# Patient Record
Sex: FEMALE | Race: WHITE | NOT HISPANIC OR LATINO | Employment: STUDENT | URBAN - METROPOLITAN AREA
[De-identification: names, ages, dates, MRNs, and addresses within clinical notes are randomized per-mention and may not be internally consistent; named-entity substitution may affect disease eponyms.]

---

## 2017-01-03 DIAGNOSIS — M54.50 LUMBAR SPINE PAIN: Primary | ICD-10-CM

## 2017-01-04 ENCOUNTER — OFFICE VISIT (OUTPATIENT)
Dept: PSYCHIATRY | Facility: CLINIC | Age: 22
End: 2017-01-04
Payer: COMMERCIAL

## 2017-01-04 DIAGNOSIS — F31.9 BIPOLAR 1 DISORDER: Primary | ICD-10-CM

## 2017-01-04 DIAGNOSIS — Z63.0 MARITAL CONFLICT: ICD-10-CM

## 2017-01-04 PROCEDURE — 90847 FAMILY PSYTX W/PT 50 MIN: CPT | Mod: S$GLB,,, | Performed by: SOCIAL WORKER

## 2017-01-04 SDOH — SOCIAL DETERMINANTS OF HEALTH (SDOH): PROBLEMS IN RELATIONSHIP WITH SPOUSE OR PARTNER: Z63.0
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
## 2017-01-06 ENCOUNTER — HOSPITAL ENCOUNTER (OUTPATIENT)
Dept: RADIOLOGY | Facility: HOSPITAL | Age: 22
Discharge: HOME | End: 2017-01-06
Attending: ORTHOPAEDIC SURGERY
Payer: COMMERCIAL

## 2017-01-06 ENCOUNTER — OFFICE VISIT (OUTPATIENT)
Dept: ORTHOPEDICS | Facility: CLINIC | Age: 22
End: 2017-01-06
Payer: COMMERCIAL

## 2017-01-06 ENCOUNTER — OFFICE VISIT (OUTPATIENT)
Dept: PSYCHIATRY | Facility: CLINIC | Age: 22
End: 2017-01-06
Payer: COMMERCIAL

## 2017-01-06 VITALS
DIASTOLIC BLOOD PRESSURE: 77 MMHG | WEIGHT: 249 LBS | HEIGHT: 67 IN | HEART RATE: 97 BPM | BODY MASS INDEX: 39.08 KG/M2 | SYSTOLIC BLOOD PRESSURE: 122 MMHG

## 2017-01-06 VITALS
WEIGHT: 248.88 LBS | SYSTOLIC BLOOD PRESSURE: 107 MMHG | HEIGHT: 67 IN | HEART RATE: 83 BPM | BODY MASS INDEX: 39.06 KG/M2 | DIASTOLIC BLOOD PRESSURE: 75 MMHG

## 2017-01-06 DIAGNOSIS — M43.00 SPONDYLOLYSIS: ICD-10-CM

## 2017-01-06 DIAGNOSIS — M43.10 SPONDYLOLISTHESIS, UNSPECIFIED SPINAL REGION: Primary | ICD-10-CM

## 2017-01-06 DIAGNOSIS — M54.50 LUMBAR SPINE PAIN: ICD-10-CM

## 2017-01-06 DIAGNOSIS — F31.0 BIPOLAR I DISORDER, CURRENT OR MOST RECENT EPISODE HYPOMANIC: Primary | ICD-10-CM

## 2017-01-06 DIAGNOSIS — F41.1 GENERALIZED ANXIETY DISORDER: ICD-10-CM

## 2017-01-06 PROCEDURE — 99999 PR PBB SHADOW E&M-EST. PATIENT-LVL III: CPT | Mod: PBBFAC,,, | Performed by: PHYSICIAN ASSISTANT

## 2017-01-06 PROCEDURE — 72120 X-RAY BEND ONLY L-S SPINE: CPT | Mod: TC

## 2017-01-06 PROCEDURE — 1159F MED LIST DOCD IN RCRD: CPT | Mod: S$GLB,,,

## 2017-01-06 PROCEDURE — 99999 PR PBB SHADOW E&M-EST. PATIENT-LVL III: CPT | Mod: PBBFAC,,,

## 2017-01-06 PROCEDURE — 99214 OFFICE O/P EST MOD 30 MIN: CPT | Mod: S$GLB,,,

## 2017-01-06 PROCEDURE — 99204 OFFICE O/P NEW MOD 45 MIN: CPT | Mod: S$GLB,,, | Performed by: PHYSICIAN ASSISTANT

## 2017-01-06 PROCEDURE — 72100 X-RAY EXAM L-S SPINE 2/3 VWS: CPT | Mod: 26,,, | Performed by: RADIOLOGY

## 2017-01-06 PROCEDURE — 72120 X-RAY BEND ONLY L-S SPINE: CPT | Mod: 26,,, | Performed by: RADIOLOGY

## 2017-01-06 PROCEDURE — 1159F MED LIST DOCD IN RCRD: CPT | Mod: S$GLB,,, | Performed by: PHYSICIAN ASSISTANT

## 2017-01-06 PROCEDURE — 90833 PSYTX W PT W E/M 30 MIN: CPT | Mod: S$GLB,,,

## 2017-01-06 RX ORDER — QUETIAPINE FUMARATE 50 MG/1
TABLET, FILM COATED ORAL
Qty: 90 TABLET | Refills: 0 | Status: SHIPPED | OUTPATIENT
Start: 2017-01-06 | End: 2017-01-11 | Stop reason: SDUPTHER

## 2017-01-06 RX ORDER — METHYLPREDNISOLONE 4 MG/1
TABLET ORAL
Qty: 1 PACKAGE | Refills: 0 | Status: SHIPPED | OUTPATIENT
Start: 2017-01-06

## 2017-01-06 NOTE — PROGRESS NOTES
Outpatient Psychiatry Follow-Up Visit (MD/NP)    1/6/2017    Clinical Status of Patient:  Outpatient (Ambulatory)    Chief Complaint:  Lauri Mendoza is a 21 y.o. female who presents today for follow-up of mood disorder and anxiety.  Met with patient.      Interval History and Content of Current Session:  Interim Events/Subjective Report/Content of Current Session: Patient reports she feels her mood is more stable on carbamazepine but still labile, still with mood swings and crying spells, still with severe anxiety (patient stated she has not been able to leave the house before 3pm). Patient plans to stop breastfeeding and has done well on seroquel in the past, we discussed re-starting seroquel for additional mood stabilization.     Psychotherapy:  · Target symptoms: anxiety , mood swings, mood disorder, adjustment  · Why chosen therapy is appropriate versus another modality: relevant to diagnosis, patient responds to this modality, evidence based practice  · Outcome monitoring methods: self-report, observation, feedback from family  · Therapeutic intervention type: insight oriented psychotherapy, behavior modifying psychotherapy, supportive psychotherapy  · Topics discussed/themes: relationships difficulties, parenting issues, stress related to medical comorbidities, difficulty managing affect in interpersonal relationships, building skills sets for symptom management, symptom recognition, financial stressors, life stage transitional issues  · The patient's response to the intervention is accepting. The patient's progress toward treatment goals is limited.   · Duration of intervention: 20 minutes.    Review of Systems   · PSYCHIATRIC: Pertinant items are noted in the narrative.  · CONSTITUTIONAL: Positive for weight gain.     Past Medical, Family and Social History: The patient's past medical, family and social history have been reviewed and updated as appropriate within the electronic medical record - see  "encounter notes.    Compliance: yes    Side effects: None    Risk Parameters:  Patient reports no suicidal ideation  Patient reports no homicidal ideation  Patient reports no self-injurious behavior  Patient reports no violent behavior    Exam (detailed: at least 9 elements; comprehensive: all 15 elements)   Constitutional  Vitals:  Most recent vital signs, dated less than 90 days prior to this appointment, were reviewed.   Vitals:    01/06/17 1031   BP: 122/77   Pulse: 97   Weight: 112.9 kg (249 lb)   Height: 5' 7" (1.702 m)        General:  unremarkable, age appropriate, casually dressed, disheveled, overweight     Musculoskeletal  Muscle Strength/Tone:  not examined   Gait & Station:  non-ataxic     Psychiatric  Speech:  no latency; no press   Mood & Affect:  anxious, depressed  congruent and appropriate   Thought Process:  normal and logical   Associations:  intact   Thought Content:  normal, no suicidality, no homicidality, delusions, or paranoia   Insight:  intact   Judgement: behavior is adequate to circumstances   Orientation:  grossly intact   Memory: intact for content of interview   Language: grossly intact   Attention Span & Concentration:  able to focus   Fund of Knowledge:  intact and appropriate to age and level of education     Assessment and Diagnosis   Status/Progress: Based on the examination today, the patient's problem(s) is/are inadequately controlled.  New problems have not been presented today.   Co-morbidities, Diagnostic uncertainty and Lack of compliance are not complicating management of the primary condition.  There are no active rule-out diagnoses for this patient at this time.     General Impression: 21-year-old female with history of bipolar I disorder, stopped her medications when she became pregnant and was hospitalized for post-partum depression on 11/1/16, doing better back on a mood stabilizer (carbamazepine) but mediations she could take were limited because she wanted to " breastfeed; patient is done breastfeeding, plan to re-start seroquel and possibly switch back to oxcarbazepine, then taper off ativan      ICD-10-CM ICD-9-CM   1. Bipolar I disorder, current or most recent episode hypomanic F31.0 296.40   2. Generalized anxiety disorder F41.1 300.02       Intervention/Counseling/Treatment Plan   · Continue carbamazepine 600mg twice daily for mood stabilization  · Re-start seroquel; 50mg qHS on 1/6, then 100mg qHS on 1/7, 200mg qHS on 1/8, and 300mg qHS on 1/9, this was discussed extensively with patient and given to her in writing, patient to return on 1/11. Patient was taking seroquel 600mg nightly before her pregnancy  · Continue ativan 1mg three times daily as needed for anxiety (discharge medication from [facility], plan to taper when patient is more stable)    Return to Clinic: 1 week  Length of visit: 30 minutes with >50% spent in counseling
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
## 2017-01-11 ENCOUNTER — OFFICE VISIT (OUTPATIENT)
Dept: PSYCHIATRY | Facility: CLINIC | Age: 22
End: 2017-01-11
Payer: COMMERCIAL

## 2017-01-11 DIAGNOSIS — F31.0 BIPOLAR I DISORDER, CURRENT OR MOST RECENT EPISODE HYPOMANIC: Primary | ICD-10-CM

## 2017-01-11 DIAGNOSIS — F31.60 BIPOLAR I DISORDER, MOST RECENT EPISODE MIXED: ICD-10-CM

## 2017-01-11 DIAGNOSIS — F41.0 PANIC DISORDER: ICD-10-CM

## 2017-01-11 PROCEDURE — 99999 PR PBB SHADOW E&M-EST. PATIENT-LVL II: CPT | Mod: PBBFAC,,,

## 2017-01-11 PROCEDURE — 99214 OFFICE O/P EST MOD 30 MIN: CPT | Mod: S$GLB,,,

## 2017-01-11 PROCEDURE — 90833 PSYTX W PT W E/M 30 MIN: CPT | Mod: S$GLB,,,

## 2017-01-11 PROCEDURE — 1159F MED LIST DOCD IN RCRD: CPT | Mod: S$GLB,,,

## 2017-01-11 RX ORDER — QUETIAPINE FUMARATE 300 MG/1
300 TABLET, FILM COATED ORAL NIGHTLY
Qty: 30 TABLET | Refills: 2 | Status: SHIPPED | OUTPATIENT
Start: 2017-01-11 | End: 2017-01-25 | Stop reason: SINTOL

## 2017-01-11 RX ORDER — LORAZEPAM 1 MG/1
1 TABLET ORAL EVERY 12 HOURS PRN
Qty: 60 TABLET | Refills: 2 | Status: SHIPPED | OUTPATIENT
Start: 2017-01-11 | End: 2017-02-10

## 2017-01-11 RX ORDER — CARBAMAZEPINE 200 MG/1
600 TABLET, EXTENDED RELEASE ORAL 2 TIMES DAILY
Qty: 180 TABLET | Refills: 11 | Status: SHIPPED | OUTPATIENT
Start: 2017-01-11 | End: 2018-01-11

## 2017-01-11 NOTE — PROGRESS NOTES
"Outpatient Psychiatry Follow-Up Visit (MD/NP)    1/11/2017    Clinical Status of Patient:  Outpatient (Ambulatory)    Chief Complaint:  Lauri Mendoza is a 21 y.o. female who presents today for follow-up of mood disorder and anxiety.  Met with patient.      Interval History and Content of Current Session:  Interim Events/Subjective Report/Content of Current Session: Patient states that she feels her mood has been much more stable back on seroquel (patient is up to 200mg, we discussed increasing dose to 300mg) and that her anxiety has decreased, she states she feels much more "in control," she appeared less depressed and anxious today. We also discussed starting to taper off lorazepam, patient in agreement, told to decrease dose to 1mg twice daily. Patient still with some paranoid thoughts, fair insight about them. Denies any suicidal ideation or thoughts to harm self.     Psychotherapy:  · Target symptoms: anxiety , mood disorder, adjustment  · Why chosen therapy is appropriate versus another modality: relevant to diagnosis, patient responds to this modality, evidence based practice  · Outcome monitoring methods: self-report, observation  · Therapeutic intervention type: insight oriented psychotherapy, behavior modifying psychotherapy, supportive psychotherapy  · Topics discussed/themes: relationships difficulties, parenting issues, stress related to medical comorbidities, difficulty managing affect in interpersonal relationships, building skills sets for symptom management, symptom recognition, life stage transitional issues  · The patient's response to the intervention is accepting. The patient's progress toward treatment goals is fair.   · Duration of intervention: 20 minutes.    Review of Systems   · PSYCHIATRIC: Pertinant items are noted in the narrative.  · CONSTITUTIONAL: No weight gain or loss.     Past Medical, Family and Social History: The patient's past medical, family and social history have " been reviewed and updated as appropriate within the electronic medical record - see encounter notes.    Compliance: yes    Side effects: nightmares per patient    Risk Parameters:  Patient reports no suicidal ideation  Patient reports no homicidal ideation  Patient reports no self-injurious behavior  Patient reports no violent behavior    Exam (detailed: at least 9 elements; comprehensive: all 15 elements)   Constitutional  Vitals:  Most recent vital signs, dated less than 90 days prior to this appointment, were reviewed.   There were no vitals filed for this visit.     General:  unremarkable, age appropriate, casually dressed, overweight     Musculoskeletal  Muscle Strength/Tone:  not examined   Gait & Station:  non-ataxic     Psychiatric  Speech:  no latency; no press   Mood & Affect:  steady, happy  congruent and appropriate   Thought Process:  normal and logical   Associations:  intact   Thought Content:  normal, no suicidality, no homicidality, delusions, or paranoia   Insight:  limited awareness of illness   Judgement: behavior is adequate to circumstances   Orientation:  grossly intact   Memory: intact for content of interview   Language: grossly intact   Attention Span & Concentration:  able to focus   Fund of Knowledge:  intact and appropriate to age and level of education     Assessment and Diagnosis   Status/Progress: Based on the examination today, the patient's problem(s) is/are improved and inadequately controlled.  New problems have not been presented today.   Co-morbidities, Diagnostic uncertainty and Lack of compliance are not complicating management of the primary condition.  There are no active rule-out diagnoses for this patient at this time.     General Impression: 21-year-old female with history of bipolar I disorder, stopped her medications when she became pregnant and was hospitalized for post-partum depression on 11/1/16, doing better back on a mood stabilizer (carbamazepine) but mediations  she could take were limited because she wanted to breastfeed. Patient stopped breastfeeding and mood is more stable on carbamazepine and seroquel, starting to taper off ativan.      ICD-10-CM ICD-9-CM   1. Bipolar I disorder, current or most recent episode hypomanic F31.0 296.40   2. Panic disorder F41.0 300.01   3. Bipolar I disorder, most recent episode mixed F31.60 296.60       Intervention/Counseling/Treatment Plan   · Increase seroquel 300mg nightly  · Decrease ativan 1mg twice daily as needed   · Continue tegretol 600mg twice daily  · Tegretol level ordered    Return to Clinic: 2 weeks  Length of visit: 30 minutes with >50% spent in counseling

## 2017-01-13 ENCOUNTER — HOSPITAL ENCOUNTER (OUTPATIENT)
Dept: RADIOLOGY | Facility: HOSPITAL | Age: 22
Discharge: HOME | End: 2017-01-13
Attending: ORTHOPAEDIC SURGERY
Payer: COMMERCIAL

## 2017-01-13 ENCOUNTER — OFFICE VISIT (OUTPATIENT)
Dept: PSYCHIATRY | Facility: CLINIC | Age: 22
End: 2017-01-13
Payer: COMMERCIAL

## 2017-01-13 DIAGNOSIS — F31.9 BIPOLAR I DISORDER, MOST RECENT EPISODE (OR CURRENT) UNSPECIFIED: Primary | ICD-10-CM

## 2017-01-13 DIAGNOSIS — M43.00 SPONDYLOLYSIS: ICD-10-CM

## 2017-01-13 DIAGNOSIS — M43.10 SPONDYLOLISTHESIS, UNSPECIFIED SPINAL REGION: ICD-10-CM

## 2017-01-13 PROCEDURE — 72148 MRI LUMBAR SPINE W/O DYE: CPT | Mod: 26,,, | Performed by: RADIOLOGY

## 2017-01-13 PROCEDURE — 72148 MRI LUMBAR SPINE W/O DYE: CPT | Mod: TC

## 2017-01-13 PROCEDURE — 90834 PSYTX W PT 45 MINUTES: CPT | Mod: S$GLB,,, | Performed by: SOCIAL WORKER

## 2017-01-16 NOTE — PROGRESS NOTES
"Individual Psychotherapy (PhD/LCSW)    1/13/2017    Site:  [facility]         Therapeutic Intervention: Met with patient.  Outpatient - Insight oriented psychotherapy 45 min - CPT code 80816    Chief complaint/reason for encounter: mood, paranoia      Interval history and content of current session: Patient states, "I'm doing a lot better."  She feels mood has been "more stable," and goes on to say, "Even if I do get emotional, it's not bad like it was before."  She and  continue to participate in couples counseling, and she cites improved communication between them.  She mentions she sometimes compares her marriage to previous relationships.  Discussed the unfairness in making these types of comparisons.  Also, discussed emotional regulation, including the usefulness of DBT techniques.  Patient speaks about enjoying time she spends with her infant son.  She sometimes is overwhelmed with multiple MD appointments for his special needs, but overall, feels she is adapting well.  's family remains somewhat intrusive.  Discussed ways to appropriately communicate boundaries, without alienating them, as support/involvement is still desired.  Patient mentions she and  are contemplating relocating to [place], [place], where her family and friends reside.      Treatment plan:  · Target symptoms: adjustment, mood  · Why chosen therapy is appropriate versus another modality: relevant to diagnosis  · Outcome monitoring methods: self-report, observation  · Therapeutic intervention type: insight oriented psychotherapy    Risk parameters:  Patient reports no suicidal ideation  Patient reports no homicidal ideation  Patient reports no self-injurious behavior  Patient reports no violent behavior    Verbal deficits: None    Patient's response to intervention:  The patient's response to intervention is accepting.    Progress toward goals and other mental status changes:  The patient's progress toward goals is " fair.    Diagnosis:     ICD-10-CM ICD-9-CM   1. Bipolar I disorder, most recent episode (or current) unspecified F31.9 296.7       Plan:  individual psychotherapy, family psychotherapy and medication management by physician    Return to clinic: as scheduled    Length of Service (minutes): 45

## 2017-01-17 ENCOUNTER — OFFICE VISIT (OUTPATIENT)
Dept: PSYCHIATRY | Facility: CLINIC | Age: 22
End: 2017-01-17
Payer: COMMERCIAL

## 2017-01-17 DIAGNOSIS — F31.9 BIPOLAR 1 DISORDER: Primary | ICD-10-CM

## 2017-01-17 PROCEDURE — 90847 FAMILY PSYTX W/PT 50 MIN: CPT | Mod: S$GLB,,, | Performed by: SOCIAL WORKER

## 2017-01-17 NOTE — PROGRESS NOTES
Family Psychotherapy (PhD/LCSW)    1/17/2017    Site: [facility]    Length of service: 45    Therapeutic intervention: 90747-Family therapy with patient; needed because marital conflict           Persons present: patient and spouse     Interval history: Patient and her spouse returned to the clinic today for follow up family session.  Patient is tearful during the initial part of session today.  She is feeling overwhelmed by the baby's childcare needs.  Her spouse is working long hours into the evening.  He is helping adequately when he is off from work.  For example, he gets up with the baby at night.  Patient's mother is coming in from [place], [place] to help with the baby today.  Discussed the importance of life balance and self-care with patient.  Patient wants to work again.  She doesn't believe she will be able to do this again, however, until they move to [place].  Her spouse has some reservations about living in her ex-boyfriend's home upon the move to [place], which is the plan.  This is a point of contention during session.  Patient became upset with spouse when this issue was brought up.  She does defer to him on the decision on where to live in [place].  Patient does not feel that her mood is stable.         Target symptoms: adjustment, interpersonal            Patient's interpersonal/verbal exchanges: 90897-Family therapy with patient:  active listening, frequent questions and self-disclosure    Progress toward goals: progressing slowly    Diagnosis: marital conflict, bipolar 1 disorder, panic disorder               Plan: individual psychotherapy  family psychotherapy  medication management by physician  SELENA    Return to clinic: 2 weeks

## 2017-01-19 ENCOUNTER — OFFICE VISIT (OUTPATIENT)
Dept: ORTHOPEDICS | Facility: CLINIC | Age: 22
End: 2017-01-19
Payer: COMMERCIAL

## 2017-01-19 VITALS — HEIGHT: 67 IN | BODY MASS INDEX: 38.65 KG/M2 | WEIGHT: 246.25 LBS

## 2017-01-19 DIAGNOSIS — M43.00 SPONDYLOLYSIS: ICD-10-CM

## 2017-01-19 DIAGNOSIS — M43.10 SPONDYLOLISTHESIS, UNSPECIFIED SPINAL REGION: Primary | ICD-10-CM

## 2017-01-19 PROCEDURE — 99213 OFFICE O/P EST LOW 20 MIN: CPT | Mod: S$GLB,,, | Performed by: PHYSICIAN ASSISTANT

## 2017-01-19 PROCEDURE — 1159F MED LIST DOCD IN RCRD: CPT | Mod: S$GLB,,, | Performed by: PHYSICIAN ASSISTANT

## 2017-01-19 PROCEDURE — 99999 PR PBB SHADOW E&M-EST. PATIENT-LVL III: CPT | Mod: PBBFAC,,, | Performed by: PHYSICIAN ASSISTANT

## 2017-01-19 NOTE — PROGRESS NOTES
"DATE: 1/19/2017  PATIENT: Lauri Mendoza    Attending Physician: Irineo Hallman M.D.    HISTORY:  Lauri Mendoza is a 21 y.o. female who returns to me today for MRI results.  She was last seen by me 1/6/2017.  Today she is doing well.  She has known isthmic spondylolisthesis at L5/S1.     The Patient denies myelopathic symptoms such as handwriting changes or difficulty with buttons/coins/keys. Denies perineal paresthesias, bowel/bladder dysfunction.    PMH/PSH/FamHx/SocHx:  Unchanged from prior visit    ROS:  REVIEW OF SYSTEMS:  Constitution: Negative. Negative for chills, fever and night sweats.   HENT: Negative for congestion and headaches.    Eyes: Negative for blurred vision, left vision loss and right vision loss.   Cardiovascular: Negative for chest pain and syncope.   Respiratory: Negative for cough and shortness of breath.    Endocrine: Negative for polydipsia, polyphagia and polyuria.   Hematologic/Lymphatic: Negative for bleeding problem. Does not bruise/bleed easily.   Skin: Negative for dry skin, itching and rash.   Musculoskeletal: Negative for falls and muscle weakness.   Gastrointestinal: Negative for abdominal pain and bowel incontinence.   Allergic/Immunologic: Negative for hives and persistent infections.  Genitourinary: Negative for urinary retention/incontinence and nocturia.   Neurological: Negative for disturbances in coordination, no myelopathic symptoms such as handwriting changes or difficulty with buttons, coins, keys or small objects. No loss of balance and seizures.   Psychiatric/Behavioral: Negative for depression. The patient does not have insomnia.   Denies perineal paresthesias, bowel or bladder incontinence    EXAM:  Visit Vitals    Ht 5' 7" (1.702 m)    Wt 111.7 kg (246 lb 4.1 oz)    BMI 38.57 kg/m2       General: The patient is a very pleasant 21 y.o. female in no apparent distress, the patient is oriented to person, place and time.  Psych: Normal mood and " affect  HEENT: Vision grossly intact, hearing intact to the spoken word.  Lungs: Respirations unlabored.  Gait: Normal station and gait, no difficulty with toe or heel walk.   Skin: Dorsal lumbar skin negative for rashes, lesions, hairy patches and surgical scars. There is mild lumbar tenderness to palpation.  Range of motion: Lumbar range of motion is acceptable.  Spinal Balance: Global saggital and coronal spinal balance acceptable, not significant for scoliosis and kyphosis.  Musculoskeletal: No pain with the range of motion of the bilateral hips. No trochanteric tenderness to palpation.  Vascular: Bilateral lower extremities warm and well perfused, dorsalis pedis pulses 2+ bilaterally.  Neurological: Normal strength and tone in all major motor groups in the bilateral lower extremities. Normal sensation to light touch in the L2-S1 dermatomes bilaterally.  Deep tendon reflexes symmetric 2+ in the bilateral lower extremities.  Negative Babinski bilaterally. Straight leg raise positive bilaterally, reproduces back pain. Positive hyperextension bilaterally.       IMAGING:    Today I personally re- reviewed AP, Lat and Flex/Ex  upright L-spine that demonstrate spondylolysis with associated spondylolisthesis at L5/S1.     MRI lumbar spine demonstrates no significant spinal stenosis or neural foraminal narrowing.    ASSESSMENT/PLAN:    Diagnoses and all orders for this visit:    Spondylolisthesis, unspecified spinal region  -     Procedure Order to [facility]; Future    Spondylolysis  -     Procedure Order to [facility]; Future    All treatment options discussed.  The patient would like to try an injection.  Order placed for TFESI L5/S1.  She would also like to try PT but is about to move.  She will call when she decides where to go.  Follow up after injection if symptoms persist.     Return if symptoms worsen or fail to improve.

## 2017-01-25 ENCOUNTER — PATIENT MESSAGE (OUTPATIENT)
Dept: PSYCHIATRY | Facility: CLINIC | Age: 22
End: 2017-01-25

## 2017-01-25 ENCOUNTER — OFFICE VISIT (OUTPATIENT)
Dept: PSYCHIATRY | Facility: CLINIC | Age: 22
End: 2017-01-25
Payer: COMMERCIAL

## 2017-01-25 VITALS
WEIGHT: 245 LBS | DIASTOLIC BLOOD PRESSURE: 56 MMHG | BODY MASS INDEX: 38.45 KG/M2 | SYSTOLIC BLOOD PRESSURE: 116 MMHG | HEART RATE: 88 BPM | HEIGHT: 67 IN

## 2017-01-25 DIAGNOSIS — F41.9 ANXIETY: ICD-10-CM

## 2017-01-25 DIAGNOSIS — F31.0 BIPOLAR I DISORDER, CURRENT OR MOST RECENT EPISODE HYPOMANIC: Primary | ICD-10-CM

## 2017-01-25 PROCEDURE — 99214 OFFICE O/P EST MOD 30 MIN: CPT | Mod: S$GLB,,,

## 2017-01-25 PROCEDURE — 90833 PSYTX W PT W E/M 30 MIN: CPT | Mod: S$GLB,,,

## 2017-01-25 PROCEDURE — 99999 PR PBB SHADOW E&M-EST. PATIENT-LVL III: CPT | Mod: PBBFAC,,,

## 2017-01-25 PROCEDURE — 1159F MED LIST DOCD IN RCRD: CPT | Mod: S$GLB,,,

## 2017-01-25 RX ORDER — ARIPIPRAZOLE 20 MG/1
20 TABLET ORAL DAILY
Qty: 30 TABLET | Refills: 2 | Status: SHIPPED | OUTPATIENT
Start: 2017-01-25 | End: 2018-01-25

## 2017-01-27 NOTE — PROGRESS NOTES
Outpatient Psychiatry Follow-Up Visit (MD/NP)    1/25/2017    Clinical Status of Patient:  Outpatient (Ambulatory)    Chief Complaint:  Lauri Mendoza is a 21 y.o. female who presents today for follow-up of mood disorder and anxiety.  Met with patient.      Interval History and Content of Current Session:  Interim Events/Subjective Report/Content of Current Session: Patient reports that although seroquel was effective for her when she has been on it in the past, she is currently experiencing nightly nightmares and feels she is too sedated at night, anxious that she won't hear her baby crying if he wakes up. She reports her anxiety has also increased but some of this is to be expected considering we are currently tapering ativan. Patient did not have blood drawn for tegretol level, re-iterated the importance of this with her and she plans to do so this week. We discussed switching from seroquel to abilify as it may be less sedating. She does report her mood is more stable on both a mood stabilizer and an anti-psychotic than it was before she restarted seroquel. Still feels like she gets too overwhelmed if she is alone for 12 hours. She and her  are also moving back to [place] in one month and patient reports she has been very overwhelmed with packing and preparing.     Psychotherapy:  · Target symptoms: anxiety , mood disorder, adjustment  · Why chosen therapy is appropriate versus another modality: relevant to diagnosis, patient responds to this modality, evidence based practice  · Outcome monitoring methods: self-report, observation, feedback from family  · Therapeutic intervention type: insight oriented psychotherapy, behavior modifying psychotherapy, supportive psychotherapy  · Topics discussed/themes: relationships difficulties, parenting issues, stress related to medical comorbidities, difficulty managing affect in interpersonal relationships, building skills sets for symptom management,  "symptom recognition, financial stressors, life stage transitional issues  · The patient's response to the intervention is accepting. The patient's progress toward treatment goals is limited.   · Duration of intervention: 16 minutes.    Review of Systems   · PSYCHIATRIC: Pertinant items are noted in the narrative.  · CONSTITUTIONAL: No weight gain or loss.     Past Medical, Family and Social History: The patient's past medical, family and social history have been reviewed and updated as appropriate within the electronic medical record - see encounter notes.    Compliance: yes    Side effects: somnolence, nightmares(?)    Risk Parameters:  Patient reports no suicidal ideation  Patient reports no homicidal ideation  Patient reports no self-injurious behavior  Patient reports no violent behavior    Exam (detailed: at least 9 elements; comprehensive: all 15 elements)   Constitutional  Vitals:  Most recent vital signs, dated less than 90 days prior to this appointment, were reviewed.   Vitals:    01/25/17 1406   BP: (!) 116/56   Pulse: 88   Weight: 111.1 kg (245 lb)   Height: 5' 7" (1.702 m)        General:  unremarkable, age appropriate, casually dressed, neatly groomed, obese     Musculoskeletal  Muscle Strength/Tone:  not examined   Gait & Station:  non-ataxic     Psychiatric  Speech:  no latency; no press   Mood & Affect:  anxious  congruent and appropriate   Thought Process:  normal and logical   Associations:  intact   Thought Content:  normal, no suicidality, no homicidality, delusions, or paranoia   Insight:  limited awareness of illness   Judgement: limited   Orientation:  grossly intact   Memory: intact for content of interview   Language: grossly intact   Attention Span & Concentration:  able to focus   Fund of Knowledge:  intact and appropriate to age and level of education     Assessment and Diagnosis   Status/Progress: Based on the examination today, the patient's problem(s) is/are inadequately controlled.  " New problems have not been presented today.   Co-morbidities, Diagnostic uncertainty and Lack of compliance are not complicating management of the primary condition.  There are no active rule-out diagnoses for this patient at this time.     General Impression: 21-year-old female with history of bipolar I disorder, stopped her medications when she became pregnant and was hospitalized for post-partum depression on 11/1/16, doing better back on a mood stabilizer (carbamazepine) but mediations she could take were limited because she wanted to breastfeed. Patient stopped breastfeeding and mood is more stable on carbamazepine and seroquel, starting to taper off ativan. Patient feels seroquel is too sedating, plan to switch to abilify.       ICD-10-CM ICD-9-CM   1. Bipolar I disorder, current or most recent episode hypomanic F31.0 296.40       Intervention/Counseling/Treatment Plan   · Continue ativan 1mg twice daily as needed for anxiety  · Discontinue seroquel  · Start abilify 20mg daily for bipolar disorder. Risks, benefits, and alternatives were discussed with patient and also sent via [facility] message.   · Continue psychotherapy and couples counseling    Return to Clinic: 2 weeks  Length of visit: 25 minutes with >50% spent in counseling

## 2017-02-01 ENCOUNTER — OFFICE VISIT (OUTPATIENT)
Dept: PSYCHIATRY | Facility: CLINIC | Age: 22
End: 2017-02-01
Payer: COMMERCIAL

## 2017-02-01 DIAGNOSIS — F31.9 BIPOLAR 1 DISORDER: Primary | ICD-10-CM

## 2017-02-01 PROCEDURE — 90847 FAMILY PSYTX W/PT 50 MIN: CPT | Mod: S$GLB,,, | Performed by: SOCIAL WORKER

## 2017-02-01 NOTE — PROGRESS NOTES
"Family Psychotherapy (PhD/LCSW)    2/1/2017    Site: [facility]    Length of service: 45    Therapeutic intervention: 90147-Family therapy with patient; needed because marital conflict         Persons present: patient and spouse     Interval history: Patient and her spouse returned to the clinic today for follow up family session.  They are in a good space today.  Reflect on how they are communicating better.  Patient reports that her medications were recently readjusted, which has improved her mood significantly.  Discussed ways for communication to continue to improve; discussed the use of "I" statements.  Patient reflects on how she would like for her spouse to not be such a risk taker at times (eg driving fast on the highway).  He agrees that at times he can be a risk taker.  Moving to [place] at the end of this month.  Discussed the process of transferring patient's care to [facility].           Target symptoms: adjustment, interpersonal          Patient's interpersonal/verbal exchanges: 83287-Family therapy with patient:  active listening, frequent questions and self-disclosure    Progress toward goals: progressing slowly    Diagnosis: marital conflict, bipolar 1 disorder, panic disorder               Plan: individual psychotherapy  family psychotherapy  medication management by physician    Return to clinic: 3 weeks  "

## 2017-02-02 ENCOUNTER — LAB VISIT (OUTPATIENT)
Dept: LAB | Facility: HOSPITAL | Age: 22
End: 2017-02-02
Payer: COMMERCIAL

## 2017-02-02 DIAGNOSIS — F31.0 BIPOLAR I DISORDER, CURRENT OR MOST RECENT EPISODE HYPOMANIC: ICD-10-CM

## 2017-02-02 LAB — CARBAMAZEPINE SERPL-MCNC: 8.1 UG/ML

## 2017-02-02 PROCEDURE — 36415 COLL VENOUS BLD VENIPUNCTURE: CPT

## 2017-02-02 PROCEDURE — 80156 ASSAY CARBAMAZEPINE TOTAL: CPT

## 2017-02-06 ENCOUNTER — OFFICE VISIT (OUTPATIENT)
Dept: PSYCHIATRY | Facility: CLINIC | Age: 22
End: 2017-02-06
Payer: COMMERCIAL

## 2017-02-06 DIAGNOSIS — F31.9 BIPOLAR I DISORDER, MOST RECENT EPISODE (OR CURRENT) UNSPECIFIED: Primary | ICD-10-CM

## 2017-02-06 PROCEDURE — 90834 PSYTX W PT 45 MINUTES: CPT | Mod: S$GLB,,, | Performed by: SOCIAL WORKER

## 2017-02-06 NOTE — PROGRESS NOTES
"Individual Psychotherapy (PhD/LCSW)    2/6/2017    Site:  [facility]         Therapeutic Intervention: Met with patient.  Outpatient - Insight oriented psychotherapy 45 min - CPT code 61810    Chief complaint/reason for encounter: mood, paranoia      Interval history and content of current session: Patient reports she is doing "good" today.  She displays bright affect, and feels mood has been stable.  She and  remain involved in couples counseling.  She denies any recent conflicts between them.  Patient explains she is "excited" today, because she and  are supposed to relocate to [place], [place] at the end of this month.  She cites some anxiety as she works through the logistics of the move.   has applied for a job transfer there, and is awaiting approval.  They have already secured housing - will stay with previous landlord.  She has enlisted the help of her health insurance company to locate providers for herself and her son.  She states, "Everything is falling into place.  I'll be where I want to be."  She understands that she and  may encounter stressors as they venture into a new living situation, but feels the benefits of the move will outweigh any possible complications.  She speaks about having better resources for her son and a greater support network for herself.  Discussed ways to help alleviate anxiety and ease this transition.  Also, discussed termination, as she does not think she will be able to be seen prior to leaving.  Patient understands she should resume therapy with me, should plans change and they remain in the city.        Treatment plan:  · Target symptoms: adjustment, mood  · Why chosen therapy is appropriate versus another modality: relevant to diagnosis  · Outcome monitoring methods: self-report, observation  · Therapeutic intervention type: insight oriented psychotherapy    Risk parameters:  Patient reports no suicidal ideation  Patient reports no " homicidal ideation  Patient reports no self-injurious behavior  Patient reports no violent behavior    Verbal deficits: None    Patient's response to intervention:  The patient's response to intervention is accepting.    Progress toward goals and other mental status changes:  The patient's progress toward goals is fair.    Diagnosis:     ICD-10-CM ICD-9-CM   1. Bipolar I disorder, most recent episode (or current) unspecified F31.9 296.7       Plan:  individual psychotherapy, family psychotherapy and medication management by physician; patient plans to relocate to [place], [place] - she will obtain a therapist and psychiatrist there     Return to clinic: as needed    Length of Service (minutes): 45

## 2017-02-08 ENCOUNTER — PATIENT MESSAGE (OUTPATIENT)
Dept: PSYCHIATRY | Facility: CLINIC | Age: 22
End: 2017-02-08

## 2017-03-28 ENCOUNTER — TELEPHONE (OUTPATIENT)
Dept: PSYCHIATRY | Facility: CLINIC | Age: 22
End: 2017-03-28

## 2017-03-28 NOTE — TELEPHONE ENCOUNTER
Patient reports she has had some symptoms of paranoia since moving to [place], asked if her medications could be adjusted, I explained that I am unable to manage her medications over the phone as she is no longer a patient here and that she needs to find a psychiatrist in her new location. She has an appointment with a new PCP next week and I told her that if she signs an authorization to release information that I would be happy to consult with her new doctor about her care. She reports she has enough of an existing supply of her current medications to carry her through that appointment.